# Patient Record
Sex: MALE | Race: WHITE | Employment: OTHER | URBAN - METROPOLITAN AREA
[De-identification: names, ages, dates, MRNs, and addresses within clinical notes are randomized per-mention and may not be internally consistent; named-entity substitution may affect disease eponyms.]

---

## 2021-03-23 ENCOUNTER — OFFICE VISIT (OUTPATIENT)
Dept: ENDOCRINOLOGY | Age: 81
End: 2021-03-23
Payer: MEDICARE

## 2021-03-23 VITALS
BODY MASS INDEX: 25.44 KG/M2 | DIASTOLIC BLOOD PRESSURE: 89 MMHG | SYSTOLIC BLOOD PRESSURE: 149 MMHG | WEIGHT: 162.1 LBS | HEART RATE: 85 BPM | OXYGEN SATURATION: 97 % | TEMPERATURE: 98.9 F | HEIGHT: 67 IN

## 2021-03-23 DIAGNOSIS — E55.9 VITAMIN D DEFICIENCY: Primary | ICD-10-CM

## 2021-03-23 PROBLEM — I13.0 BENIGN HYPERTENSIVE HEART AND CKD, STAGE 3 (GFR 30-59), W CHF (HCC): Status: ACTIVE | Noted: 2021-03-23

## 2021-03-23 PROBLEM — I48.91 ATRIAL FIBRILLATION (HCC): Status: ACTIVE | Noted: 2021-03-23

## 2021-03-23 PROBLEM — N18.30 BENIGN HYPERTENSIVE HEART AND CKD, STAGE 3 (GFR 30-59), W CHF (HCC): Status: ACTIVE | Noted: 2021-03-23

## 2021-03-23 PROBLEM — E11.9 DIABETES MELLITUS TYPE 2, DIET-CONTROLLED (HCC): Status: ACTIVE | Noted: 2021-03-23

## 2021-03-23 PROCEDURE — G8419 CALC BMI OUT NRM PARAM NOF/U: HCPCS | Performed by: INTERNAL MEDICINE

## 2021-03-23 PROCEDURE — G8536 NO DOC ELDER MAL SCRN: HCPCS | Performed by: INTERNAL MEDICINE

## 2021-03-23 PROCEDURE — G8510 SCR DEP NEG, NO PLAN REQD: HCPCS | Performed by: INTERNAL MEDICINE

## 2021-03-23 PROCEDURE — 1101F PT FALLS ASSESS-DOCD LE1/YR: CPT | Performed by: INTERNAL MEDICINE

## 2021-03-23 PROCEDURE — G8427 DOCREV CUR MEDS BY ELIG CLIN: HCPCS | Performed by: INTERNAL MEDICINE

## 2021-03-23 PROCEDURE — 99204 OFFICE O/P NEW MOD 45 MIN: CPT | Performed by: INTERNAL MEDICINE

## 2021-03-23 RX ORDER — DILTIAZEM HYDROCHLORIDE 120 MG/1
120 CAPSULE, EXTENDED RELEASE ORAL DAILY
COMMUNITY
End: 2021-05-26 | Stop reason: ALTCHOICE

## 2021-03-23 RX ORDER — FLECAINIDE ACETATE 50 MG/1
TABLET ORAL 2 TIMES DAILY
COMMUNITY

## 2021-03-23 RX ORDER — ACETAMINOPHEN 500 MG
2000 TABLET ORAL 2 TIMES DAILY
Qty: 60 CAP | Refills: 5 | Status: SHIPPED | OUTPATIENT
Start: 2021-03-23 | End: 2022-01-12

## 2021-03-23 RX ORDER — GLUCOSAMINE SULFATE 1500 MG
POWDER IN PACKET (EA) ORAL DAILY
COMMUNITY
End: 2021-03-23 | Stop reason: DRUGHIGH

## 2021-03-23 RX ORDER — LANOLIN ALCOHOL/MO/W.PET/CERES
CREAM (GRAM) TOPICAL
COMMUNITY

## 2021-03-23 RX ORDER — SIMVASTATIN 20 MG/1
TABLET, FILM COATED ORAL
COMMUNITY

## 2021-03-23 NOTE — PROGRESS NOTES
History and Physical    Patient: Wilma Gilman MRN: [id number]  SSN: [id number]    YOB: 1940  Age: [de-identified] y.o. Sex: male      Subjective:      Wilma Gilman is a [de-identified] y.o. male with past medical history of hypertension, atrial fibrillation, chronic kidney disease stage III, diet-controlled type 2 diabetes mellitus is sent to me by primary care physician Dr. Luci Jett for vitamin D deficiency, secondary hyperparathyroidism. Patient tells me that he had labs done by PCP a few months back which came back showing that his vitamin D level was low. However, when he went to his nephrologist, he was told that his vitamin D level was fine. He has chronic kidney disease stage III, not sure about the etiology, likely hypertensive, per patient his renal function has been improving. He sees nephrologist Dr. Elena Dow. Patient has been taking vitamin D 1000 units 2 capsules every day over-the-counter for the past 1 year or so. Does not take calcium tablets. Consumes calcium rich foods. Denies any fractures in his adult life. No kidney stones. Past Medical History:   Diagnosis Date    Anemia     Vitamin D deficiency      History reviewed. No pertinent surgical history. Family History   Problem Relation Age of Onset    Diabetes Father      Social History     Tobacco Use    Smoking status: Never Smoker    Smokeless tobacco: Never Used   Substance Use Topics    Alcohol use: Never     Frequency: Never      Prior to Admission medications    Medication Sig Start Date End Date Taking? Authorizing Provider   dilTIAZem ER [facility]) 120 mg capsule Take 120 mg by mouth daily. Yes Provider, Historical   flecainide (TAMBOCOR) 50 mg tablet Take  by mouth two (2) times a day. Yes Provider, Historical   simvastatin (ZOCOR) 20 mg tablet Take  by mouth nightly. Yes Provider, Historical   ferrous sulfate 325 mg (65 mg iron) tablet Take  by mouth Daily (before breakfast).    Yes Provider, Historical   cholecalciferol (VITAMIN D3) (2,000 UNITS /50 MCG) cap capsule Take 1 Cap by mouth two (2) times a day for 30 days. 3/23/21 4/22/21 Yes Va Slaughter MD        No Known Allergies    Review of Systems:  ROS    A comprehensive review of systems was preformed and it is negative except mentioned in HPI    Objective:     Vitals:    03/23/21 1511 03/23/21 1518   BP: (!) 157/79 (!) 149/89   Pulse: 91 85   Temp: 98.9 °F (37.2 °C)    TempSrc: Temporal    SpO2: 97%    Weight: 162 lb 1.6 oz (73.5 kg)    Height: 5' 7\" (1.702 m)         Physical Exam:    Physical Exam  Vitals signs and nursing note reviewed.   Constitutional:       Appearance: Normal appearance.   HENT:      Head: Normocephalic and atraumatic.   Eyes:      Extraocular Movements: Extraocular movements intact.      Pupils: Pupils are equal, round, and reactive to light.   Neck:      Musculoskeletal: Neck supple.   Cardiovascular:      Rate and Rhythm: Normal rate and regular rhythm.   Pulmonary:      Effort: Pulmonary effort is normal.      Breath sounds: Normal breath sounds.   Abdominal:      General: Bowel sounds are normal.      Palpations: Abdomen is soft.   Musculoskeletal: Normal range of motion.         General: No swelling.   Skin:     General: Skin is warm.   Neurological:      General: No focal deficit present.      Mental Status: He is oriented to person, place, and time.   Psychiatric:         Mood and Affect: Mood normal.         Behavior: Behavior normal.          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 1-:  25 hydroxy vitamin D low at 24.9  GFR 38  Calcium normal at 9.9  No PTH, phosphorus available for review. Currently on vitamin D 2000 units daily. Plan:  I think this is just nutritional vitamin D deficiency and it is not severe. Advised patient to increase vitamin D to 2000 units twice a day. Check vitamin D level in 2 months, prior to next visit. Chronic kidney disease stage III:  GFR 38 in January 2021. Regularly following with nephrologist.  Per patient his renal function is slowly improving. Type 2 diabetes mellitus, diet controlled:  Hemoglobin A1c 6.6% in January 2021. Being managed by primary care physician. Essential hypertension:  Blood pressure well controlled on current medications. Orders Placed This Encounter    VITAMIN D, 25 HYDROXY     Standing Status:   Future     Standing Expiration Date:   9/23/2021    cholecalciferol (VITAMIN D3) (2,000 UNITS /50 MCG) cap capsule     Sig: Take 1 Cap by mouth two (2) times a day for 30 days.      Dispense:  60 Cap     Refill:  5        Signed By: Prasanna Rojas MD     March 23, 2021      Return to clinic 2 months

## 2021-03-23 NOTE — LETTER
3/23/2021 Patient: Marcial Suggs YOB: 1940 Date of Visit: 3/23/2021 Kristi Salvador, [address] [place] [address] Via Fax: [phone] Dear Kristi Salvador MD, Thank you for referring Mr. Bob Curiel to [address] [place] [address] for evaluation. My notes for this consultation are attached. If you have questions, please do not hesitate to call me. I look forward to following your patient along with you. Sincerely, Fidelia Andino MD

## 2021-05-12 LAB — 25(OH)D3+25(OH)D2 SERPL-MCNC: 32.1 NG/ML (ref 30–100)

## 2021-05-23 DIAGNOSIS — E55.9 VITAMIN D DEFICIENCY: ICD-10-CM

## 2021-05-26 ENCOUNTER — OFFICE VISIT (OUTPATIENT)
Dept: ENDOCRINOLOGY | Age: 81
End: 2021-05-26
Payer: MEDICARE

## 2021-05-26 VITALS
SYSTOLIC BLOOD PRESSURE: 129 MMHG | TEMPERATURE: 98.2 F | OXYGEN SATURATION: 100 % | WEIGHT: 164.5 LBS | BODY MASS INDEX: 25.82 KG/M2 | HEIGHT: 67 IN | DIASTOLIC BLOOD PRESSURE: 73 MMHG | HEART RATE: 75 BPM

## 2021-05-26 DIAGNOSIS — E55.9 VITAMIN D DEFICIENCY: Primary | ICD-10-CM

## 2021-05-26 PROCEDURE — 1101F PT FALLS ASSESS-DOCD LE1/YR: CPT | Performed by: INTERNAL MEDICINE

## 2021-05-26 PROCEDURE — G8510 SCR DEP NEG, NO PLAN REQD: HCPCS | Performed by: INTERNAL MEDICINE

## 2021-05-26 PROCEDURE — G8419 CALC BMI OUT NRM PARAM NOF/U: HCPCS | Performed by: INTERNAL MEDICINE

## 2021-05-26 PROCEDURE — 99213 OFFICE O/P EST LOW 20 MIN: CPT | Performed by: INTERNAL MEDICINE

## 2021-05-26 PROCEDURE — G8536 NO DOC ELDER MAL SCRN: HCPCS | Performed by: INTERNAL MEDICINE

## 2021-05-26 PROCEDURE — G8427 DOCREV CUR MEDS BY ELIG CLIN: HCPCS | Performed by: INTERNAL MEDICINE

## 2021-05-26 RX ORDER — DILTIAZEM HYDROCHLORIDE 120 MG/1
CAPSULE, EXTENDED RELEASE ORAL
COMMUNITY
Start: 2021-02-19

## 2021-05-26 NOTE — PROGRESS NOTES
History and Physical    Patient: Giovany Sanchez MRN: [id number]  SSN: [id number]    YOB: 1940  Age: [de-identified] y.o. Sex: male      Subjective:      Giovany Sanchez is a [de-identified] y.o. male with past medical history of hypertension, atrial fibrillation, chronic kidney disease stage III, diet-controlled type 2 diabetes mellitus is here for follow-up of vitamin D deficiency. He was sent to me by primary care physician Dr. Dariusz Collier. At the last visit vitamin D was increased from 2000 units daily to 2000 units twice a day. Patient ran out a few days back and he did not get a chance to  his refill from pharmacy, so he has been taking his previous prescription which was 500 units, 2 tablets in the morning and 2 in the evening. Labs were repeated prior to this visit. Denies any change in his health since the last visit. Today he tells me he is feeling great. Initial history:  Patient tells me that he had labs done by PCP a few months back which came back showing that his vitamin D level was low. However, when he went to his nephrologist, he was told that his vitamin D level was fine. He has chronic kidney disease stage III, not sure about the etiology, likely hypertensive, per patient his renal function has been improving. He sees nephrologist Dr. Leo Ramos. Patient has been taking vitamin D 1000 units 2 capsules every day over-the-counter for the past 1 year or so. Does not take calcium tablets. Consumes calcium rich foods. Denies any fractures in his adult life. No kidney stones. Past Medical History:   Diagnosis Date    Anemia     Vitamin D deficiency      History reviewed. No pertinent surgical history.    Family History   Problem Relation Age of Onset    Diabetes Father      Social History     Tobacco Use    Smoking status: Never Smoker    Smokeless tobacco: Never Used   Substance Use Topics    Alcohol use: Never      Prior to Admission medications    Medication Sig Start Date End Date Taking? Authorizing Provider   DILT- mg capsule TAKE 1 CAPSULE BY MOUTH ONCE DAILY 2/19/21  Yes Provider, Historical   flecainide (TAMBOCOR) 50 mg tablet Take  by mouth two (2) times a day. Yes Provider, Historical   simvastatin (ZOCOR) 20 mg tablet Take  by mouth nightly. Yes Provider, Historical   ferrous sulfate 325 mg (65 mg iron) tablet Take  by mouth Daily (before breakfast). Yes Provider, Historical        No Known Allergies    Review of Systems:  ROS    A comprehensive review of systems was preformed and it is negative except mentioned in HPI    Objective:     Vitals:    05/26/21 1239   BP: 129/73   Pulse: 75   Temp: 98.2 °F (36.8 °C)   TempSrc: Temporal   SpO2: 100%   Weight: 164 lb 8 oz (74.6 kg)   Height: 5' 7\" (1.702 m)        Physical Exam:    Physical Exam  Vitals and nursing note reviewed. Constitutional:       Appearance: Normal appearance. HENT:      Head: Normocephalic and atraumatic. Cardiovascular:      Rate and Rhythm: Normal rate and regular rhythm. Pulmonary:      Effort: Pulmonary effort is normal.      Breath sounds: Normal breath sounds. Musculoskeletal:      Cervical back: Neck supple. Labs and Imaging:  Results for Gaurav Marlow (MRN [id number]) as of 5/26/2021 12:42   Ref.  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 Currently on vitamin D 2000 units daily. 5-:  Vitamin D normal at 32.1  Plan:  Continue vitamin D 2000 units twice a day. Follow-up with PCP. Chronic kidney disease stage III:  GFR 38 in January 2021. Regularly following with nephrologist.  Per patient his renal function is slowly improving. Type 2 diabetes mellitus, diet controlled:  Hemoglobin A1c 6.6% in January 2021. Being managed by primary care physician. Essential hypertension:  Blood pressure well controlled on current medications. Continue the same. No orders of the defined types were placed in this encounter.        Signed By: Katy Phelps MD     May 26, 2021      Return to clinic as needed

## 2021-05-26 NOTE — LETTER
5/26/2021 Patient: Sasha Johnson YOB: 1940 Date of Visit: 5/26/2021 Nelly Mesa, [address] [facility] [address] Via Fax: [phone] Dear Nelly Mesa MD, Thank you for referring Mr. Tom Spaulding to [address] [place] [address] for evaluation. My notes for this consultation are attached. If you have questions, please do not hesitate to call me. I look forward to following your patient along with you. Sincerely, Dino May MD

## 2022-01-12 DIAGNOSIS — E55.9 VITAMIN D DEFICIENCY: ICD-10-CM

## 2022-01-12 RX ORDER — NICOTINE 11MG/24HR
PATCH, TRANSDERMAL 24 HOURS TRANSDERMAL
Qty: 60 CAPSULE | Refills: 5 | Status: SHIPPED | OUTPATIENT
Start: 2022-01-12

## 2022-03-19 PROBLEM — I48.91 ATRIAL FIBRILLATION (HCC): Status: ACTIVE | Noted: 2021-03-23

## 2022-03-19 PROBLEM — E11.9 DIABETES MELLITUS TYPE 2, DIET-CONTROLLED (HCC): Status: ACTIVE | Noted: 2021-03-23

## 2022-03-19 PROBLEM — N18.30 BENIGN HYPERTENSIVE HEART AND CKD, STAGE 3 (GFR 30-59), W CHF (HCC): Status: ACTIVE | Noted: 2021-03-23

## 2022-03-19 PROBLEM — E55.9 VITAMIN D DEFICIENCY: Status: ACTIVE | Noted: 2021-03-23

## 2022-03-19 PROBLEM — I13.0 BENIGN HYPERTENSIVE HEART AND CKD, STAGE 3 (GFR 30-59), W CHF (HCC): Status: ACTIVE | Noted: 2021-03-23

## 2023-05-22 RX ORDER — DILTIAZEM HYDROCHLORIDE 120 MG/1
1 CAPSULE, EXTENDED RELEASE ORAL DAILY
COMMUNITY
Start: 2021-02-19

## 2023-05-22 RX ORDER — FERROUS SULFATE 325(65) MG
TABLET ORAL
COMMUNITY

## 2023-05-22 RX ORDER — FLECAINIDE ACETATE 50 MG/1
TABLET ORAL 2 TIMES DAILY
COMMUNITY

## 2023-05-22 RX ORDER — SIMVASTATIN 20 MG
TABLET ORAL
COMMUNITY